# Patient Record
(demographics unavailable — no encounter records)

---

## 2017-08-25 NOTE — ER DOCUMENT REPORT
HPI





- HPI


Patient complains to provider of: Pruritic rash


Onset: Other - Several days


Onset/Duration: Gradual


Pain Level: 2


Context: 





26-year-old female complaining of rash medial aspect of bilateral upper thighs, 

anterior left thigh and now left chest.  No fever.  No exposure to poison ivy 

or change in detergents anything that she can think of that would cause a rash.


Associated Symptoms: None


Exacerbated by: Denies


Relieved by: Denies


Similar symptoms previously: No


Recently seen / treated by doctor: No





- ROS


ROS below otherwise negative: Yes


Systems Reviewed and Negative: Yes All other systems reviewed and negative





- REPRODUCTIVE


LMP: 8/24/17


Reproductive: DENIES: Pregnant:





- DERM


Skin Color: Erythema





Past Medical History





- General


Information source: Patient





- Social History


Smoking Status: Never Smoker


Frequency of alcohol use: None


Drug Abuse: None


Lives with: Family


Family History: Reviewed & Not Pertinent


Patient has suicidal ideation: No


Patient has homicidal ideation: No


Pulmonary Medical History: Reports: Hx Asthma


Renal/ Medical History: Denies: Hx Peritoneal Dialysis


Surgical Hx: Negative





- Immunizations


Hx Diphtheria, Pertussis, Tetanus Vaccination: Yes





Vertical Provider Document





- CONSTITUTIONAL


Agree With Documented VS: Yes


Exam Limitations: No Limitations


General Appearance: No Apparent Distress





- INFECTION CONTROL


TRAVEL OUTSIDE OF THE [place] IN LAST 30 DAYS: No





- HEENT


HEENT: Normal ENT Exam





- NECK


Neck: Supple





- RESPIRATORY


Respiratory: Breath Sounds Normal, No Respiratory Distress


O2 Sat by Pulse Oximetry: 98





- CARDIOVASCULAR


Cardiovascular: Regular Rate, Regular Rhythm





- MUSCULOSKELETAL/EXTREMETIES


Musculoskeletal/Extremeties: MAEW, FROM, Non-Tender





- NEURO


Level of Consciousness: Awake, Alert





- DERM


Integumentary: Rash - Mild folliculitis in kissing areas of the medial inner 

obese thighs without infection, 3 areas left anterior thigh that have linear 

excoriations from scratching possible inflammation or impetigo at the sites 

similar appearing crusted area right upper chest that she scratched.





Course





- Vital Signs


Vital signs: 


 











Temp Pulse Resp BP Pulse Ox


 


 97.9 F   74   18   118/76   98 


 


 08/25/17 08:19  08/25/17 08:19  08/25/17 08:19  08/25/17 08:19  08/25/17 08:19














Discharge





- Discharge


Clinical Impression: 


 possible scabies, Impetigo





Condition: Good


Disposition: HOME, SELF-CARE


Instructions:  Anti-Mite Skin Creams, Bactroban Ointment (OM), Dermatologist, 

Use of Diphenhydramine, Impetigo (OM)


Additional Instructions: 


use the elimite cream from toes to neck, wash off in 12 hours


bactroban ointment as antibiotic, small amount three times per day to the 

inflamed areas on left thigh, and right chest


to er if worse


see dermatologist if persists








Prescriptions: 


Mupirocin [Bactroban 2% Ointment 22 gm] 1 applic TP TID #1 tube


Permethrin [Elimite] 60 gm TP ONCE PRN #1 cream.gm.


 PRN Reason: 


Forms:  Return to Work